# Patient Record
Sex: FEMALE | Race: WHITE | NOT HISPANIC OR LATINO | Employment: OTHER | URBAN - NONMETROPOLITAN AREA
[De-identification: names, ages, dates, MRNs, and addresses within clinical notes are randomized per-mention and may not be internally consistent; named-entity substitution may affect disease eponyms.]

---

## 2017-10-09 ENCOUNTER — TRANSCRIBE ORDERS (OUTPATIENT)
Dept: ADMINISTRATIVE | Facility: HOSPITAL | Age: 77
End: 2017-10-09

## 2017-10-09 DIAGNOSIS — M81.0 SENILE OSTEOPOROSIS: Primary | ICD-10-CM

## 2018-08-10 ENCOUNTER — TRANSCRIBE ORDERS (OUTPATIENT)
Dept: ADMINISTRATIVE | Facility: HOSPITAL | Age: 78
End: 2018-08-10

## 2018-08-10 DIAGNOSIS — Z12.31 VISIT FOR SCREENING MAMMOGRAM: Primary | ICD-10-CM

## 2018-08-14 ENCOUNTER — HOSPITAL ENCOUNTER (OUTPATIENT)
Dept: MAMMOGRAPHY | Facility: HOSPITAL | Age: 78
Discharge: HOME | End: 2018-08-14
Attending: INTERNAL MEDICINE | Admitting: INTERNAL MEDICINE

## 2018-08-14 DIAGNOSIS — Z12.31 VISIT FOR SCREENING MAMMOGRAM: ICD-10-CM

## 2018-08-14 PROCEDURE — 77063 BREAST TOMOSYNTHESIS BI: CPT | Performed by: RADIOLOGY

## 2018-08-14 PROCEDURE — 77067 SCR MAMMO BI INCL CAD: CPT | Performed by: RADIOLOGY

## 2018-08-14 PROCEDURE — 77067 SCR MAMMO BI INCL CAD: CPT

## 2018-08-14 PROCEDURE — 77063 BREAST TOMOSYNTHESIS BI: CPT

## 2019-05-14 ENCOUNTER — TRANSCRIBE ORDERS (OUTPATIENT)
Dept: ADMINISTRATIVE | Facility: HOSPITAL | Age: 79
End: 2019-05-14

## 2019-05-14 DIAGNOSIS — Z12.31 VISIT FOR SCREENING MAMMOGRAM: Primary | ICD-10-CM

## 2019-08-15 ENCOUNTER — HOSPITAL ENCOUNTER (OUTPATIENT)
Dept: MAMMOGRAPHY | Facility: HOSPITAL | Age: 79
Discharge: HOME | End: 2019-08-15
Admitting: INTERNAL MEDICINE

## 2019-08-15 DIAGNOSIS — Z12.31 VISIT FOR SCREENING MAMMOGRAM: ICD-10-CM

## 2019-08-15 PROCEDURE — 77067 SCR MAMMO BI INCL CAD: CPT | Performed by: RADIOLOGY

## 2019-08-15 PROCEDURE — 77063 BREAST TOMOSYNTHESIS BI: CPT | Performed by: RADIOLOGY

## 2019-08-15 PROCEDURE — 77067 SCR MAMMO BI INCL CAD: CPT

## 2019-08-15 PROCEDURE — 77063 BREAST TOMOSYNTHESIS BI: CPT

## 2021-03-12 ENCOUNTER — IMMUNIZATION (OUTPATIENT)
Dept: VACCINE CLINIC | Facility: HOSPITAL | Age: 81
End: 2021-03-12

## 2021-03-12 PROCEDURE — 91300 HC SARSCOV02 VAC 30MCG/0.3ML IM: CPT | Performed by: INTERNAL MEDICINE

## 2021-03-12 PROCEDURE — 0001A: CPT | Performed by: INTERNAL MEDICINE

## 2021-04-02 ENCOUNTER — IMMUNIZATION (OUTPATIENT)
Dept: VACCINE CLINIC | Facility: HOSPITAL | Age: 81
End: 2021-04-02

## 2021-04-02 PROCEDURE — 0002A: CPT | Performed by: INTERNAL MEDICINE

## 2021-04-02 PROCEDURE — 91300 HC SARSCOV02 VAC 30MCG/0.3ML IM: CPT | Performed by: INTERNAL MEDICINE

## 2021-11-04 ENCOUNTER — IMMUNIZATION (OUTPATIENT)
Dept: VACCINE CLINIC | Facility: HOSPITAL | Age: 81
End: 2021-11-04

## 2021-11-04 PROCEDURE — 91300 HC SARSCOV02 VAC 30MCG/0.3ML IM: CPT | Performed by: INTERNAL MEDICINE

## 2021-11-04 PROCEDURE — 0004A ADM SARSCOV2 30MCG/0.3ML BOOSTER: CPT | Performed by: INTERNAL MEDICINE

## 2022-10-28 ENCOUNTER — TRANSCRIBE ORDERS (OUTPATIENT)
Dept: ADMINISTRATIVE | Facility: HOSPITAL | Age: 82
End: 2022-10-28

## 2022-10-28 DIAGNOSIS — R79.89 ABNORMAL LFTS: Primary | ICD-10-CM

## 2022-11-21 ENCOUNTER — HOSPITAL ENCOUNTER (OUTPATIENT)
Dept: ULTRASOUND IMAGING | Facility: HOSPITAL | Age: 82
Discharge: HOME | End: 2022-11-21
Admitting: INTERNAL MEDICINE

## 2022-11-21 DIAGNOSIS — R79.89 ABNORMAL LFTS: ICD-10-CM

## 2022-11-21 PROCEDURE — 76700 US EXAM ABDOM COMPLETE: CPT

## 2022-11-21 PROCEDURE — 76700 US EXAM ABDOM COMPLETE: CPT | Performed by: RADIOLOGY

## 2023-01-06 NOTE — PROGRESS NOTES
DATE OF CONSULTATION:  01/09/23     REASON FOR REFERRAL: History of breast cancer    REFERRING PHYSICIAN:  Dr. Padgett    CHIEF COMPLAINT:  History of breast and endometrial cancer      HISTORY OF PRESENT ILLNESS:   Esther Frederick is a very pleasant 82 y.o. female who is being seen today in the presence of her  at the request of Dr. Padgett for evaluation and continued management for her history of breast and endometrial cancer.  Ms. Frederick has been following with Dr. Coombs who is retiring and so she is here to establish care.      Ms. Frederick was diagnosed with a low grade endometrial cancer in 1995 which was treated surgically with complete hysterectomy and incidental appendectomy (Performed by Dr. Kraft).  She hasn't had any evidence of recurrence but hasn't seen a gynecologist in some time.    Ms. 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  She underwent lumpectomy / ALND performed by Dr. Daryn Villarreal on 11/8/01.  Tumor was 1.7 cm in maximal dimension with associated DCIS. Surgical marginsw were clear.  0/25 LN involved.  ER/IA strongly +, HER-2/[place] - per outside records.  She was started on Tamoxifen in December 2001, but this was apparently discontinued in September 2002 b/c of elevated LFTs.  She received adjuvant radiation from Dr. Willie Ferguson which she completed in March 2002 and she was started on Femara 11/4/03 and remains on this medication.    Ms. Frederick says she is overall doing well.  She denies change in her weight.  She denies chest pain or shortness of breath but struggles with allergies and chronic cough as a result.  She complains of recently developed LLQ pain. Which comes and goes.  She denies constipation or diarrhea.  She has had difficulty with periodic rectal bleeding which she feels is related to hemorrhoids.  She says she previously had c-scope which she thinks was done by Dr. Argueta  but says it has been awhile. .  She has been taking Femara for just shy of 20 years and wonders how long she needs to take this.      PAST MEDICAL HISTORY:  Past Medical History:   Diagnosis Date   • Anemia    • Asthma    • Breast cancer (HCC) 1993    RIGHT   • Colon polyps    • Diabetes mellitus (HCC)    • GERD (gastroesophageal reflux disease)    • Hx of radiation therapy 1993    RIGHT   • Hypertension        PAST SURGICAL HISTORY:  Past Surgical History:   Procedure Laterality Date   • BREAST BIOPSY Right 1993   • BREAST CYST ASPIRATION Left     YRS AGO   • BREAST LUMPECTOMY     • HYSTERECTOMY  1990   • OOPHORECTOMY Bilateral 1990       FAMILY HISTORY:  Family History   Problem Relation Age of Onset   • Stroke Mother    • Heart attack Father    • Lung cancer Sister    • Diabetes Sister    • Cancer Sister    • Breast cancer Maternal Aunt         AGE UNKNOWN   • Breast cancer Paternal Aunt         unknown   • Breast cancer Other         AGE UNKNOWN   • Ovarian cancer Neg Hx    M. Aunt had Breast Cancer  P. Aunt had breast cancer  Sister had breast cancer in her 60s and her daughter (patient's niece) developed breast cancer in her 30s.  Sister had lung cancer and her daughter (patient's Niece) had ovarian cancer     SOCIAL HISTORY:  Social History     Socioeconomic History   • Marital status:    Tobacco Use   • Smoking status: Never   Vaping Use   • Vaping Use: Never used   Substance and Sexual Activity   • Alcohol use: Never   • Drug use: Never   • Sexual activity: Defer   .  [facility]  Lifelong non-smoker.      REVIEW OF SYSTEMS:   A comprehensive 14 point review of systems was performed.  Significant findings as mentioned above.  All other systems reviewed and are negative.      MEDICATIONS:  The current medication list was reviewed in the EMR    Current Outpatient Medications:   •  atenolol-chlorthalidone (TENORETIC) 50-25 MG per tablet, Take 1 tablet  by mouth Daily., Disp: , Rfl:   •  cetirizine (zyrTEC) 10 MG tablet, Take 10 mg by mouth Daily., Disp: , Rfl:   •  glimepiride (AMARYL) 1 MG tablet, Take 1 mg by mouth Every Morning Before Breakfast., Disp: , Rfl:   •  letrozole (FEMARA) 2.5 MG tablet, Take  by mouth Daily., Disp: , Rfl:   •  metFORMIN (GLUCOPHAGE) 1000 MG tablet, Take 1,000 mg by mouth 2 (Two) Times a Day With Meals., Disp: , Rfl:   •  multivitamin with minerals (CENTRUM SILVER 50+WOMEN PO), Take 1 tablet by mouth Daily., Disp: , Rfl:   •  simvastatin (ZOCOR) 20 MG tablet, Take 20 mg by mouth Every Night., Disp: , Rfl:     ALLERGIES:    Allergies   Allergen Reactions   • Cats Claw (Uncaria Tomentosa) Hives   • Dog Epithelium Allergy Skin Test Hives       PHYSICAL EXAM:  Vitals:    01/09/23 1033   BP: 141/77   Pulse: 77   Resp: 18   Temp: 97.1 °F (36.2 °C)   TempSrc: Temporal   SpO2: 98%   Weight: 55 kg (121 lb 3.2 oz)   Height: 162.6 cm (64\")   PainSc: 0-No pain     Body surface area is 1.58 meters squared.   Body mass index is 20.8 kg/m².      ECOG score: 0     PHQ-9 Total Score: 0    General:  Awake, alert and oriented, in no distress  HEENT:  Pupils are equal, round and reactive to light and accommodation, Extra-ocular movements full, Oropharyx clear, mucous membranes moist  Neck:  No JVD, thyromegaly or lymphadenopathy  CV:  Regular rate and rhythm, no murmurs, rubs or gallops  Resp:  Lungs are clear to auscultation bilaterally  Breast:  S/p R lumpectomy.  She has fibrocystic changes but no palpable mass lesions on the right.  No R axillary LAD.  L breast without mass lesions.  No L axillary LAD.  Abd:  Soft, non-tender, sl distended, bowel sounds present, no organomegaly or masses, old surgical scars present.   Ext:  No clubbing, cyanosis or edema  Lymph:  No cervical, supraclavicular, axillary, inguinal or femoral adenopathy  Neuro:  MS as above, CN II-XII intact, grossly non-focal exam      PATHOLOGY:  Formal pathology documents no longer  available for review, but results found in old oncology notes from Dr. Loomis.       ENDOSCOPY:        IMAGING:    Mammo Screening Digital Tomosynthesis Bilateral With CAD (08/14/2018 11:54)  FINDINGS:   There are scattered areas of fibroglandular density. No  suspicious masses, microcalcifications or areas of architectural   distortion are identified.  Changes consistent with right breast  conservation therapy are stable.     IMPRESSION:  Benign screening mammogram.     RECOMMENDATION:  Continue annual screening mammography.     BI-RADS CATEGORY 2, BENIGN.        CAD was utilized.     The standard false-negative rate of mammography is between 10% and 25%.   Complex patterns or increased breast density will markedly elevate the  false-negative rate of mammography.        Mammo Screening Digital Tomosynthesis Bilateral With CAD (08/15/2019 12:55)  FINDINGS: There are scattered areas of fibroglandular density. The  fibroglandular pattern is stable. There are stable post lumpectomy  changes involving the right breast. There is no mass, worrisome  microcalcifications, or architectural distortion to suggest development  of malignancy.     IMPRESSION:  Benign screening mammogram.  No findings suspicious for  malignancy.        ACR BI-RADS CATEGORY:  2, BENIGN     RECOMMENDATION: Yearly mammogram, yearly clinical breast exam, and  encourage self breast awareness.     CAD was used.     The standard false negative rate of mammography is between 10% and 25%.   Complex patterns or increased breast density will markedly elevate the  false negative rate of mammography.     US Abdomen Complete (11/21/2022 08:29)  FINDINGS:    Liver:  Unremarkable.  No mass.  No intrahepatic bile duct dilation.    Gallbladder:  Unremarkable.  No gallstones.    Common bile duct:  Unremarkable as visualized.  No stones.  No  dilation.  Common bile duct measures 0.4 cm in diameter.    Pancreas:  Unremarkable as visualized.    Kidneys:  Unremarkable.   No stones.  No hydronephrosis.  The right  kidney measures 9.5 cm in length.  The left kidney measures 10.1 cm in  length.    Spleen:  Unremarkable.  The spleen measures 7 cm in maximum dimension.    Aorta:  Unremarkable.  No aneurysm.    Inferior vena cava:  Unremarkable.     IMPRESSION:    No acute findings in the abdomen.      RECENT LABS:  Labs to be drawn today        ASSESSMENT & PLAN:  Esther Frederick is a very pleasant 82 y.o. female with:    1.  History of Low Grade Endometrial Cancer:  -  She underwent TIA/BSO with incidental appendectomy in 1995. She says a Dr. Kraft did her surgery.    2. 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    -  She underwent lumpectomy / ALND performed by Dr. Daryn Villarreal on 11/8/01.  Tumor was 1.7 cm in maximal dimension with associated DCIS. Surgical margins were clear.  0/25 LN involved.  ER/MS strongly +, HER-2/[place] - per outside records.    -  She was started on Tamoxifen in December 2001, but this was apparently discontinued in September 2002 b/c of elevated LFTs.    -  She received adjuvant radiation from Dr. Willie Ferguson which she completed in March 2002   -  She was started on Femara 11/4/03 and remains on this medication.  -  Exam is benign today.  She is well past the recommended 10 years of adjuvant hormonal therapy, so will go ahead and stop Femara today.  -  She is overdue for mammogram.  Will order this today.   -  Will order DEXA scan.  -  Check baseline labwork today as below.    3.  LLQ discomfort:  -  May be related to adhesions from her remote TIA/BSO.  Will order CT A/P  -  She also reports h/o rectal bleeding which she attributes to hemorrhoids.  She has prior h/o c-scope performed by Dr. Argueta.  Will attempt to get records.    4.  Family History of cancer:  -  Ms. Frederick has a h/o breast and endometrial cancer.  -  She had both a m. Aunt and p. Aunt who had breast cancer.  -   One sister had breast cancer and her daughter (patient's nieceP had breast cancer in her 30s.  -  Another sister had lung cancer and her daughter (patient's niece) had ovarian cancer.  -  Discussed consideration of referral for genetic testing. She says she will think on it and let us know.    5.  Prophylaxis:  -  Had COVID vaccines including Bivalent COVID booster.  -  She doesn't take flu vaccine b/c she had a good friend who had Guillain [place] and she has been frightened about the possibility.    6.  ACO / CALISTA/Other  Quality measures  -  Esther Frederick did not receive 2022 flu vaccine.She says she hasn't been able to take this after a good friend had Guillain [place]  -  Esther Frederick reports a pain score of 0.  Given her pain assessment as noted, treatment options were discussed and the following options were decided upon as a follow-up plan to address the patient's pain: No intervention needed at this time..  -  Current outpatient and discharge medications have been reconciled for the patient.  Reviewed by: Amelia Levine MD    7.  F/u:  -  Order tom mammogram.  -  Order DEXA scan  -  Check with Dr. Argueta to see if he has records of pt's last c-scope.  -  D/C Femara which she has taken for almost 20 years  -  Check CT A/P for evaluation of LLQ discomfort.  -  She will consider referral to genetics.  -  Future referral to gynecology.        I spent 60 minutes with Esther Frederick today.  In the office today, more than 50% of this time was spent face-to-face with her  in counseling / coordination of care, reviewing her medical history and counseling on the current treatment plan.  All questions were answered to her satisfaction      Electronically Signed by: Amelia Levine MD      CC:   Damon Padgett MD

## 2023-01-09 ENCOUNTER — CONSULT (OUTPATIENT)
Dept: ONCOLOGY | Facility: CLINIC | Age: 83
End: 2023-01-09
Payer: MEDICARE

## 2023-01-09 VITALS
OXYGEN SATURATION: 98 % | HEART RATE: 77 BPM | WEIGHT: 121.2 LBS | SYSTOLIC BLOOD PRESSURE: 141 MMHG | BODY MASS INDEX: 20.69 KG/M2 | HEIGHT: 64 IN | DIASTOLIC BLOOD PRESSURE: 77 MMHG | TEMPERATURE: 97.1 F | RESPIRATION RATE: 18 BRPM

## 2023-01-09 DIAGNOSIS — R53.83 OTHER FATIGUE: ICD-10-CM

## 2023-01-09 DIAGNOSIS — Z85.42 HISTORY OF ENDOMETRIAL CANCER: ICD-10-CM

## 2023-01-09 DIAGNOSIS — R53.83 OTHER FATIGUE: Primary | ICD-10-CM

## 2023-01-09 DIAGNOSIS — E55.9 VITAMIN D DEFICIENCY: ICD-10-CM

## 2023-01-09 DIAGNOSIS — M81.8 OSTEOPOROSIS DUE TO MALABSORPTION: ICD-10-CM

## 2023-01-09 DIAGNOSIS — R10.32 LLQ PAIN: ICD-10-CM

## 2023-01-09 DIAGNOSIS — Z79.890 HORMONE REPLACEMENT THERAPY: ICD-10-CM

## 2023-01-09 DIAGNOSIS — K90.9 OSTEOPOROSIS DUE TO MALABSORPTION: ICD-10-CM

## 2023-01-09 DIAGNOSIS — D50.8 OTHER IRON DEFICIENCY ANEMIA: ICD-10-CM

## 2023-01-09 DIAGNOSIS — M81.0 AGE-RELATED OSTEOPOROSIS WITHOUT CURRENT PATHOLOGICAL FRACTURE: ICD-10-CM

## 2023-01-09 LAB
25(OH)D3 SERPL-MCNC: 49.8 NG/ML (ref 30–100)
ALBUMIN SERPL-MCNC: 4.4 G/DL (ref 3.5–5.2)
ALBUMIN/GLOB SERPL: 1.2 G/DL
ALP SERPL-CCNC: 57 U/L (ref 39–117)
ALT SERPL W P-5'-P-CCNC: 75 U/L (ref 1–33)
ANION GAP SERPL CALCULATED.3IONS-SCNC: 8.5 MMOL/L (ref 5–15)
AST SERPL-CCNC: 53 U/L (ref 1–32)
BASOPHILS # BLD AUTO: 0.05 10*3/MM3 (ref 0–0.2)
BASOPHILS NFR BLD AUTO: 0.6 % (ref 0–1.5)
BILIRUB SERPL-MCNC: 0.2 MG/DL (ref 0–1.2)
BUN SERPL-MCNC: 15 MG/DL (ref 8–23)
BUN/CREAT SERPL: 22.4 (ref 7–25)
CALCIUM SPEC-SCNC: 10.2 MG/DL (ref 8.6–10.5)
CHLORIDE SERPL-SCNC: 101 MMOL/L (ref 98–107)
CO2 SERPL-SCNC: 30.5 MMOL/L (ref 22–29)
CREAT SERPL-MCNC: 0.67 MG/DL (ref 0.57–1)
DEPRECATED RDW RBC AUTO: 48.9 FL (ref 37–54)
EGFRCR SERPLBLD CKD-EPI 2021: 87.9 ML/MIN/1.73
EOSINOPHIL # BLD AUTO: 0.1 10*3/MM3 (ref 0–0.4)
EOSINOPHIL NFR BLD AUTO: 1.3 % (ref 0.3–6.2)
ERYTHROCYTE [DISTWIDTH] IN BLOOD BY AUTOMATED COUNT: 13.9 % (ref 12.3–15.4)
FERRITIN SERPL-MCNC: 103.8 NG/ML (ref 13–150)
FOLATE SERPL-MCNC: >20 NG/ML (ref 4.78–24.2)
GLOBULIN UR ELPH-MCNC: 3.6 GM/DL
GLUCOSE SERPL-MCNC: 60 MG/DL (ref 65–99)
HCT VFR BLD AUTO: 39.4 % (ref 34–46.6)
HGB BLD-MCNC: 13.5 G/DL (ref 12–15.9)
IMM GRANULOCYTES # BLD AUTO: 0.01 10*3/MM3 (ref 0–0.05)
IMM GRANULOCYTES NFR BLD AUTO: 0.1 % (ref 0–0.5)
IRON 24H UR-MRATE: 113 MCG/DL (ref 37–145)
IRON SATN MFR SERPL: 26 % (ref 20–50)
LYMPHOCYTES # BLD AUTO: 2.77 10*3/MM3 (ref 0.7–3.1)
LYMPHOCYTES NFR BLD AUTO: 34.8 % (ref 19.6–45.3)
MCH RBC QN AUTO: 32.5 PG (ref 26.6–33)
MCHC RBC AUTO-ENTMCNC: 34.3 G/DL (ref 31.5–35.7)
MCV RBC AUTO: 94.7 FL (ref 79–97)
MONOCYTES # BLD AUTO: 1.16 10*3/MM3 (ref 0.1–0.9)
MONOCYTES NFR BLD AUTO: 14.6 % (ref 5–12)
NEUTROPHILS NFR BLD AUTO: 3.86 10*3/MM3 (ref 1.7–7)
NEUTROPHILS NFR BLD AUTO: 48.6 % (ref 42.7–76)
NRBC BLD AUTO-RTO: 0 /100 WBC (ref 0–0.2)
PLATELET # BLD AUTO: 249 10*3/MM3 (ref 140–450)
PMV BLD AUTO: 10.8 FL (ref 6–12)
POTASSIUM SERPL-SCNC: 3.8 MMOL/L (ref 3.5–5.2)
PROT SERPL-MCNC: 8 G/DL (ref 6–8.5)
RBC # BLD AUTO: 4.16 10*6/MM3 (ref 3.77–5.28)
SODIUM SERPL-SCNC: 140 MMOL/L (ref 136–145)
TIBC SERPL-MCNC: 434 MCG/DL (ref 298–536)
TRANSFERRIN SERPL-MCNC: 291 MG/DL (ref 200–360)
TSH SERPL DL<=0.05 MIU/L-ACNC: 3.73 UIU/ML (ref 0.27–4.2)
VIT B12 BLD-MCNC: 422 PG/ML (ref 211–946)
WBC NRBC COR # BLD: 7.95 10*3/MM3 (ref 3.4–10.8)

## 2023-01-09 PROCEDURE — 99205 OFFICE O/P NEW HI 60 MIN: CPT | Performed by: INTERNAL MEDICINE

## 2023-01-09 PROCEDURE — 80053 COMPREHEN METABOLIC PANEL: CPT | Performed by: INTERNAL MEDICINE

## 2023-01-09 PROCEDURE — 82746 ASSAY OF FOLIC ACID SERUM: CPT | Performed by: INTERNAL MEDICINE

## 2023-01-09 PROCEDURE — 82728 ASSAY OF FERRITIN: CPT | Performed by: INTERNAL MEDICINE

## 2023-01-09 PROCEDURE — 82306 VITAMIN D 25 HYDROXY: CPT | Performed by: INTERNAL MEDICINE

## 2023-01-09 PROCEDURE — 84443 ASSAY THYROID STIM HORMONE: CPT | Performed by: INTERNAL MEDICINE

## 2023-01-09 PROCEDURE — 83540 ASSAY OF IRON: CPT | Performed by: INTERNAL MEDICINE

## 2023-01-09 PROCEDURE — 84466 ASSAY OF TRANSFERRIN: CPT | Performed by: INTERNAL MEDICINE

## 2023-01-09 PROCEDURE — 82607 VITAMIN B-12: CPT | Performed by: INTERNAL MEDICINE

## 2023-01-09 PROCEDURE — 85025 COMPLETE CBC W/AUTO DIFF WBC: CPT | Performed by: INTERNAL MEDICINE

## 2023-01-09 RX ORDER — GLIMEPIRIDE 1 MG/1
1 TABLET ORAL
COMMUNITY

## 2023-01-09 RX ORDER — SIMVASTATIN 20 MG
20 TABLET ORAL NIGHTLY
COMMUNITY

## 2023-01-09 RX ORDER — ATENOLOL AND CHLORTHALIDONE TABLET 50; 25 MG/1; MG/1
1 TABLET ORAL DAILY
COMMUNITY

## 2023-01-09 RX ORDER — MULTIPLE VITAMINS W/ MINERALS TAB 9MG-400MCG
1 TAB ORAL DAILY
COMMUNITY

## 2023-01-09 RX ORDER — LETROZOLE 2.5 MG/1
TABLET, FILM COATED ORAL DAILY
COMMUNITY

## 2023-01-09 RX ORDER — CETIRIZINE HYDROCHLORIDE 10 MG/1
10 TABLET ORAL DAILY
COMMUNITY

## 2023-01-27 ENCOUNTER — APPOINTMENT (OUTPATIENT)
Dept: BONE DENSITY | Facility: HOSPITAL | Age: 83
End: 2023-01-27
Payer: MEDICARE

## 2023-01-27 ENCOUNTER — HOSPITAL ENCOUNTER (OUTPATIENT)
Dept: CT IMAGING | Facility: HOSPITAL | Age: 83
Discharge: HOME | End: 2023-01-27
Admitting: INTERNAL MEDICINE
Payer: MEDICARE

## 2023-01-27 DIAGNOSIS — Z79.890 HORMONE REPLACEMENT THERAPY: ICD-10-CM

## 2023-01-27 DIAGNOSIS — R10.32 LLQ PAIN: ICD-10-CM

## 2023-01-27 DIAGNOSIS — Z85.42 HISTORY OF ENDOMETRIAL CANCER: ICD-10-CM

## 2023-01-27 DIAGNOSIS — R53.83 OTHER FATIGUE: ICD-10-CM

## 2023-01-27 PROCEDURE — 25010000002 IOPAMIDOL 61 % SOLUTION: Performed by: INTERNAL MEDICINE

## 2023-01-27 PROCEDURE — 74177 CT ABD & PELVIS W/CONTRAST: CPT | Performed by: RADIOLOGY

## 2023-01-27 PROCEDURE — 74177 CT ABD & PELVIS W/CONTRAST: CPT

## 2023-01-27 RX ADMIN — IOPAMIDOL 70 ML: 612 INJECTION, SOLUTION INTRAVENOUS at 08:00

## 2023-02-06 ENCOUNTER — HOSPITAL ENCOUNTER (OUTPATIENT)
Dept: BONE DENSITY | Facility: HOSPITAL | Age: 83
Discharge: HOME | End: 2023-02-06
Admitting: INTERNAL MEDICINE
Payer: MEDICARE

## 2023-02-06 DIAGNOSIS — Z85.42 HISTORY OF ENDOMETRIAL CANCER: ICD-10-CM

## 2023-02-06 DIAGNOSIS — M81.0 AGE-RELATED OSTEOPOROSIS WITHOUT CURRENT PATHOLOGICAL FRACTURE: ICD-10-CM

## 2023-02-06 DIAGNOSIS — R53.83 OTHER FATIGUE: ICD-10-CM

## 2023-02-06 DIAGNOSIS — Z79.890 HORMONE REPLACEMENT THERAPY: ICD-10-CM

## 2023-02-06 DIAGNOSIS — R10.32 LLQ PAIN: ICD-10-CM

## 2023-02-06 PROCEDURE — 77080 DXA BONE DENSITY AXIAL: CPT

## 2023-02-06 PROCEDURE — 77080 DXA BONE DENSITY AXIAL: CPT | Performed by: RADIOLOGY

## 2023-02-08 NOTE — PROGRESS NOTES
NAME: Esther Frederick    : 1940    DATE:  23     DIAGNOSIS:  1.  History of Low Grade Endometrial Cancer in  s/p hysterectomy    2.  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    CHIEF COMPLAINT:  History of breast and endometrial cancer      HISTORY OF PRESENT ILLNESS:   Esther Frederick is a very pleasant 82 y.o. female who is being seen today in the presence of her  at the request of Dr. Padgett for evaluation and continued management for her history of breast and endometrial cancer.  Ms. Frederick has been following with Dr. Coombs who is retiring and so she is here to establish care.      Ms. Frederick was diagnosed with a low grade endometrial cancer in  which was treated surgically with complete hysterectomy and incidental appendectomy (Performed by Dr. Kraft).  She hasn't had any evidence of recurrence but hasn't seen a gynecologist in some time.    Ms. 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  She underwent lumpectomy / ALND performed by Dr. Daryn Villarreal on 01.  Tumor was 1.7 cm in maximal dimension with associated DCIS. Surgical marginsw were clear.  0/25 LN involved.  ER/[place] strongly +, HER-2/[place] - per outside records.  She was started on Tamoxifen in 2001, but this was apparently discontinued in 2002 b/c of elevated LFTs.  She received adjuvant radiation from Dr. Willie Ferguson which she completed in 2002 and she was started on Femara 03 and remains on this medication.    Ms. Frederick says she is overall doing well.  She denies change in her weight.  She denies chest pain or shortness of breath but struggles with allergies and chronic cough as a result.  She complains of recently developed LLQ pain. Which comes and goes.  She denies constipation or diarrhea.  She has had difficulty  with periodic rectal bleeding which she feels is related to hemorrhoids.  She says she previously had c-scope which she thinks was done by Dr. Argueta but says it has been awhile. .  She has been taking Femara for just shy of 20 years and wonders how long she needs to take this.      INTERVAL HISTORY:  Esther Frederick is here today for f/u of breast and endometrial cancer. Since her last visit she has overall been well.  She continues to have intermittent discomfort in the LLQ.  She is anxious to discuss testing results.  She is otherwise well and without concerns.        PAST MEDICAL HISTORY:  Past Medical History:   Diagnosis Date   • Anemia    • Asthma    • Breast cancer (HCC) 1993    RIGHT   • Colon polyps    • Diabetes mellitus (HCC)    • GERD (gastroesophageal reflux disease)    • Hx of radiation therapy 1993    RIGHT   • Hypertension        PAST SURGICAL HISTORY:  Past Surgical History:   Procedure Laterality Date   • BREAST BIOPSY Right 1993   • BREAST CYST ASPIRATION Left     YRS AGO   • BREAST LUMPECTOMY     • HYSTERECTOMY  1990   • OOPHORECTOMY Bilateral 1990       FAMILY HISTORY:  Family History   Problem Relation Age of Onset   • Stroke Mother    • Heart attack Father    • Lung cancer Sister    • Diabetes Sister    • Cancer Sister    • Breast cancer Maternal Aunt         AGE UNKNOWN   • Breast cancer Paternal Aunt         unknown   • Breast cancer Other         AGE UNKNOWN   • Ovarian cancer Neg Hx    M. Aunt had Breast Cancer  P. Aunt had breast cancer  Sister had breast cancer in her 60s and her daughter (patient's niece) developed breast cancer in her 30s.  Sister had lung cancer and her daughter (patient's Niece) had ovarian cancer     SOCIAL HISTORY:  Social History     Socioeconomic History   • Marital status:    Tobacco Use   • Smoking status: Never   Vaping Use   • Vaping Use: Never used   Substance and Sexual Activity   • Alcohol use: Never   • Drug use: Never   • Sexual activity: Defer  "  .  [facility]  Lifelong non-smoker.      REVIEW OF SYSTEMS:   A comprehensive 14 point review of systems was performed.  Significant findings as mentioned above.  All other systems reviewed and are negative.      MEDICATIONS:  The current medication list was reviewed in the EMR    Current Outpatient Medications:   •  atenolol-chlorthalidone (TENORETIC) 50-25 MG per tablet, Take 1 tablet by mouth Daily., Disp: , Rfl:   •  cetirizine (zyrTEC) 10 MG tablet, Take 10 mg by mouth Daily., Disp: , Rfl:   •  glimepiride (AMARYL) 1 MG tablet, Take 1 mg by mouth Every Morning Before Breakfast., Disp: , Rfl:   •  letrozole (FEMARA) 2.5 MG tablet, Take  by mouth Daily., Disp: , Rfl:   •  metFORMIN (GLUCOPHAGE) 1000 MG tablet, Take 1,000 mg by mouth 2 (Two) Times a Day With Meals., Disp: , Rfl:   •  multivitamin with minerals tablet tablet, Take 1 tablet by mouth Daily., Disp: , Rfl:   •  simvastatin (ZOCOR) 20 MG tablet, Take 20 mg by mouth Every Night., Disp: , Rfl:     ALLERGIES:    Allergies   Allergen Reactions   • Cats Claw (Uncaria Tomentosa) Hives   • Dog Epithelium Allergy Skin Test Hives   • Polymyxin B Rash       PHYSICAL EXAM:  Vitals:    02/09/23 1108   BP: 130/75   Pulse: 75   Resp: 18   Temp: 97.3 °F (36.3 °C)   TempSrc: Temporal   SpO2: 98%   Weight: 56.1 kg (123 lb 9.6 oz)   Height: 162.6 cm (64\")   PainSc: 0-No pain     Body surface area is 1.59 meters squared.   Body mass index is 21.22 kg/m².      ECOG score: 0     General:  Awake, alert and oriented, appears well.  HEENT:  Pupils are equal, round and reactive to light and accommodation, Extra-ocular movements full, Oropharyx clear, mucous membranes moist  Neck:  No JVD, thyromegaly or lymphadenopathy  CV:  Regular rate and rhythm, no murmurs, rubs or gallops  Resp:  Lungs are clear to auscultation bilaterally, no wheezing  Breast:  S/p R lumpectomy.  She has fibrocystic changes but no palpable mass " lesions on the right.  No R axillary LAD.  L breast without mass lesions.  No L axillary LAD.  Abd:  Soft, non-tender, sl distended, bowel sounds present, no organomegaly or masses, old surgical scars present.   Ext:  No clubbing, cyanosis, no LE edema  Lymph:  No cervical, supraclavicular, axillary, inguinal or femoral adenopathy  Neuro:  MS as above, CN II-XII intact, grossly non-focal exam      PATHOLOGY:  Formal pathology documents no longer available for review, but results found in old oncology notes from Dr. Loomis.       ENDOSCOPY:        IMAGING:    Mammo Screening Digital Tomosynthesis Bilateral With CAD (08/14/2018 11:54)  FINDINGS:   There are scattered areas of fibroglandular density. No  suspicious masses, microcalcifications or areas of architectural   distortion are identified.  Changes consistent with right breast  conservation therapy are stable.     IMPRESSION:  Benign screening mammogram.     RECOMMENDATION:  Continue annual screening mammography.     BI-RADS CATEGORY 2, BENIGN.        CAD was utilized.     The standard false-negative rate of mammography is between 10% and 25%.   Complex patterns or increased breast density will markedly elevate the  false-negative rate of mammography.        Mammo Screening Digital Tomosynthesis Bilateral With CAD (08/15/2019 12:55)  FINDINGS: There are scattered areas of fibroglandular density. The  fibroglandular pattern is stable. There are stable post lumpectomy  changes involving the right breast. There is no mass, worrisome  microcalcifications, or architectural distortion to suggest development  of malignancy.     IMPRESSION:  Benign screening mammogram.  No findings suspicious for  malignancy.        ACR BI-RADS CATEGORY:  2, BENIGN     RECOMMENDATION: Yearly mammogram, yearly clinical breast exam, and  encourage self breast awareness.     CAD was used.     The standard false negative rate of mammography is between 10% and 25%.   Complex patterns or  increased breast density will markedly elevate the  false negative rate of mammography.     US Abdomen Complete (11/21/2022 08:29)  FINDINGS:    Liver:  Unremarkable.  No mass.  No intrahepatic bile duct dilation.    Gallbladder:  Unremarkable.  No gallstones.    Common bile duct:  Unremarkable as visualized.  No stones.  No  dilation.  Common bile duct measures 0.4 cm in diameter.    Pancreas:  Unremarkable as visualized.    Kidneys:  Unremarkable.  No stones.  No hydronephrosis.  The right  kidney measures 9.5 cm in length.  The left kidney measures 10.1 cm in  length.    Spleen:  Unremarkable.  The spleen measures 7 cm in maximum dimension.    Aorta:  Unremarkable.  No aneurysm.    Inferior vena cava:  Unremarkable.     IMPRESSION:    No acute findings in the abdomen.    CT Abdomen Pelvis With Contrast (01/27/2023 08:00)  FINDINGS:     Lower thorax: Clear. No effusions.     Abdomen:     Liver: Homogeneous. No focal hepatic mass or ductal dilatation.     Gallbladder: Cholelithiasis.     Pancreas: Unremarkable. No mass or ductal dilatation.     Spleen: Homogeneous. No splenomegaly.     Adrenals: No mass.     Kidneys/ureters: No mass. No obstructive uropathy.  No evidence of  urolithiasis.     GI tract: Non-dilated. No definite wall thickening.. There is no  evidence of appendicitis     MESENTERY: No free fluid, walled off fluid collections, mesenteric  stranding, or enlarged lymph nodes     Vasculature: Evidence of atherosclerotic vascular disease.     Abdominal wall: No focal hernia or mass.     Bladder: No focal mass or significant wall thickening     Reproductive: Unremarkable as visualized     Bones: Grade 1 anterolisthesis of L4 on L5. Arthritic change.     IMPRESSION:     1. No evidence of metastatic disease to the abdomen or pelvis.     2.Other findings as above.    DEXA Bone Density Axial (02/06/2023 12:15)  FINDINGS:    RIGHT FEMORAL NECK BONE MINERAL DENSITY:  Bone mineral density of the  right femur neck  is 0.887 with T score -1.1 this is considered  osteopenic.      UNITS OF MEASURE:    Bone mineral density is measured in g/cm2.    Z-score is the number of standard deviations above age-matched  controls.    T-score is the number of standard deviations above healthy young  adults.      WORLD HEALTH ORGANIZATION GUIDELINES:    T-score at or above -1 is normal bone mineral density.    T-score between -1 and -2.5 is osteopenia.    T-score at or below -2.5 is osteoporosis.     IMPRESSION:    Bone mineral density of the right femur neck is 0.887 with T score  -1.1 this is considered osteopenic.      RECENT LABS:    Lab Results   Component Value Date    WBC 7.95 01/09/2023    HGB 13.5 01/09/2023    HCT 39.4 01/09/2023    MCV 94.7 01/09/2023    RDW 13.9 01/09/2023     01/09/2023    NEUTRORELPCT 48.6 01/09/2023    LYMPHORELPCT 34.8 01/09/2023    MONORELPCT 14.6 (H) 01/09/2023    EOSRELPCT 1.3 01/09/2023    BASORELPCT 0.6 01/09/2023    NEUTROABS 3.86 01/09/2023    LYMPHSABS 2.77 01/09/2023     Lab Results   Component Value Date    GLUCOSE 60 (L) 01/09/2023    BUN 15 01/09/2023    CREATININE 0.67 01/09/2023    EGFR 87.9 01/09/2023    BCR 22.4 01/09/2023    K 3.8 01/09/2023    CO2 30.5 (H) 01/09/2023    CALCIUM 10.2 01/09/2023    ALBUMIN 4.4 01/09/2023    AST 53 (H) 01/09/2023    ALT 75 (H) 01/09/2023     Lab Results   Component Value Date    FERRITIN 103.80 01/09/2023    IRON 113 01/09/2023    TIBC 434 01/09/2023    LABIRON 26 01/09/2023    IRFYGVTW03 422 01/09/2023    FOLATE >20.00 01/09/2023     Lab Results   Component Value Date    TSH 3.730 01/09/2023     Lab Results   Component Value Date    KLCK41FY 49.8 01/09/2023         ASSESSMENT & PLAN:  Esther Frederick is a very pleasant 82 y.o. female with:    1.  History of Low Grade Endometrial Cancer:  -  She underwent TIA/BSO with incidental appendectomy in 1995. She says a Dr. Kraft did her surgery.  -  She has done well since this time.    -  Offered referral to  gynecology but she prefers to forego this which is reasonable given the length of time since her surgery and negative CT imaging.    2. History of Stage IA ([id number]) poorly differentiated invasive ductal carcinoma of the R breast in November of 2001.    -  She underwent lumpectomy / ALND performed by Dr. Daryn Villarreal on 11/8/01.  Tumor was 1.7 cm in maximal dimension with associated DCIS. Surgical margins were clear.  0/25 LN involved.  ER/[place] strongly +, HER-2/[place] - per outside records.    -  She was started on Tamoxifen in December 2001, but this was apparently discontinued in September 2002 b/c of elevated LFTs.    -  She received adjuvant radiation from Dr. Willie Ferguson which she completed in March 2002   -  She was started on Femara 11/4/03 and remains on this medication.  -  Exam is benign today.  She is well past the recommended 10 years of adjuvant hormonal therapy, so stopped Femara.  -  She is overdue for mammogram.  Will order this today.   -  DEXA scan showed T score of -1.1, just barely below normal.  Femara has been discontinued.  -  Will plan to see her back in 1 year unless she should have problems prior to this.    3.  LLQ discomfort:  -  May be related to adhesions from her remote TIA/BSO.  CT CT A/P without cause for concern.  -  She has prior h/o c-scope performed by Dr. Argueta in 2004.  They don't have records dating back that far..    4.  Family History of cancer:  -  Ms. Frederick has a h/o breast and endometrial cancer.  -  She had both a m. Aunt and p. Aunt who had breast cancer.  -  One sister had breast cancer and her daughter (patient's nieceP had breast cancer in her 30s.  -  Another sister had lung cancer and her daughter (patient's niece) had ovarian cancer.  -  Discussed consideration of referral for genetic testing. She says she would think on it and let us know but doesn't want to pursue this at this time.    5.  Prophylaxis:  -  Had COVID vaccines including Bivalent COVID  booster.  -  She doesn't take flu vaccine b/c she had a good friend who had Guillain [place] and she has been frightened about the possibility.    6.  ACO / CALISTA/Other  Quality measures  -  Esther Frederick did not receive 2022 flu vaccine.  She says she has a lot of allergies and after watching a close friend develop Guillain [place] she decided against taking flu vaccines.   -  Esther Frederick reports a pain score of 0.  Given her pain assessment as noted, treatment options were discussed and the following options were decided upon as a follow-up plan to address the patient's pain: No intervention needed at this time.  -  Current outpatient and discharge medications have been reconciled for the patient.  Reviewed by: Amelia Levine MD      7.  F/u:  -  Order tom mammogram.  - Return to see me in 1 year with CBCD, CMP, TSH, Vit D.    I spent 30 minutes with Esther Frederick today.  In the office today, more than 50% of this time was spent face-to-face with her  in counseling / coordination of care, reviewing her medical history and counseling on the current treatment plan.  All questions were answered to her satisfaction      Electronically Signed by: Amelia Levine MD      CC:   Damon Padgett MD

## 2023-02-09 ENCOUNTER — OFFICE VISIT (OUTPATIENT)
Dept: ONCOLOGY | Facility: CLINIC | Age: 83
End: 2023-02-09
Payer: MEDICARE

## 2023-02-09 VITALS
HEART RATE: 75 BPM | WEIGHT: 123.6 LBS | DIASTOLIC BLOOD PRESSURE: 75 MMHG | HEIGHT: 64 IN | BODY MASS INDEX: 21.1 KG/M2 | TEMPERATURE: 97.3 F | RESPIRATION RATE: 18 BRPM | OXYGEN SATURATION: 98 % | SYSTOLIC BLOOD PRESSURE: 130 MMHG

## 2023-02-09 DIAGNOSIS — Z85.3 HISTORY OF BREAST CANCER: Primary | ICD-10-CM

## 2023-02-09 DIAGNOSIS — R53.83 OTHER FATIGUE: Primary | ICD-10-CM

## 2023-02-09 DIAGNOSIS — M85.80 OSTEOPENIA, UNSPECIFIED LOCATION: ICD-10-CM

## 2023-02-09 DIAGNOSIS — Z85.42 HISTORY OF ENDOMETRIAL CANCER: ICD-10-CM

## 2023-02-09 DIAGNOSIS — E55.9 VITAMIN D DEFICIENCY: ICD-10-CM

## 2023-02-09 DIAGNOSIS — R10.32 LLQ PAIN: ICD-10-CM

## 2023-02-09 DIAGNOSIS — Z12.31 ENCOUNTER FOR SCREENING MAMMOGRAM FOR MALIGNANT NEOPLASM OF BREAST: ICD-10-CM

## 2023-02-09 DIAGNOSIS — D50.8 OTHER IRON DEFICIENCY ANEMIA: ICD-10-CM

## 2023-02-09 PROCEDURE — 99214 OFFICE O/P EST MOD 30 MIN: CPT | Performed by: INTERNAL MEDICINE

## 2023-02-15 ENCOUNTER — HOSPITAL ENCOUNTER (OUTPATIENT)
Dept: MAMMOGRAPHY | Facility: HOSPITAL | Age: 83
Discharge: HOME | End: 2023-02-15
Admitting: INTERNAL MEDICINE
Payer: MEDICARE

## 2023-02-15 DIAGNOSIS — E55.9 VITAMIN D DEFICIENCY: ICD-10-CM

## 2023-02-15 DIAGNOSIS — Z85.42 HISTORY OF ENDOMETRIAL CANCER: ICD-10-CM

## 2023-02-15 DIAGNOSIS — R53.83 OTHER FATIGUE: ICD-10-CM

## 2023-02-15 DIAGNOSIS — Z12.31 ENCOUNTER FOR SCREENING MAMMOGRAM FOR MALIGNANT NEOPLASM OF BREAST: ICD-10-CM

## 2023-02-15 DIAGNOSIS — D50.8 OTHER IRON DEFICIENCY ANEMIA: ICD-10-CM

## 2023-02-15 PROCEDURE — 77067 SCR MAMMO BI INCL CAD: CPT

## 2023-02-15 PROCEDURE — 77067 SCR MAMMO BI INCL CAD: CPT | Performed by: RADIOLOGY

## 2023-02-15 PROCEDURE — 77063 BREAST TOMOSYNTHESIS BI: CPT | Performed by: RADIOLOGY

## 2023-02-15 PROCEDURE — 77063 BREAST TOMOSYNTHESIS BI: CPT

## 2023-02-21 ENCOUNTER — HOSPITAL ENCOUNTER (OUTPATIENT)
Dept: MAMMOGRAPHY | Facility: HOSPITAL | Age: 83
Discharge: HOME | End: 2023-02-21
Admitting: RADIOLOGY
Payer: MEDICARE

## 2023-02-21 DIAGNOSIS — R92.8 ABNORMAL MAMMOGRAM OF LEFT BREAST: ICD-10-CM

## 2023-02-21 PROCEDURE — 77065 DX MAMMO INCL CAD UNI: CPT

## 2023-02-21 PROCEDURE — G0279 TOMOSYNTHESIS, MAMMO: HCPCS

## 2023-02-21 PROCEDURE — G0279 TOMOSYNTHESIS, MAMMO: HCPCS | Performed by: RADIOLOGY

## 2023-02-21 PROCEDURE — 77065 DX MAMMO INCL CAD UNI: CPT | Performed by: RADIOLOGY

## 2023-04-25 ENCOUNTER — HOSPITAL ENCOUNTER (OUTPATIENT)
Dept: RESPIRATORY THERAPY | Facility: HOSPITAL | Age: 83
Discharge: HOME | End: 2023-04-25
Admitting: INTERNAL MEDICINE
Payer: MEDICARE

## 2023-04-25 ENCOUNTER — TRANSCRIBE ORDERS (OUTPATIENT)
Dept: ADMINISTRATIVE | Facility: HOSPITAL | Age: 83
End: 2023-04-25
Payer: MEDICARE

## 2023-04-25 DIAGNOSIS — I48.0 PAROXYSMAL ATRIAL FIBRILLATION: ICD-10-CM

## 2023-04-25 DIAGNOSIS — I48.0 PAROXYSMAL ATRIAL FIBRILLATION: Primary | ICD-10-CM

## 2023-04-25 PROCEDURE — 93225 XTRNL ECG REC<48 HRS REC: CPT

## 2023-04-25 PROCEDURE — 93226 XTRNL ECG REC<48 HR SCAN A/R: CPT
